# Patient Record
Sex: FEMALE | Race: WHITE | NOT HISPANIC OR LATINO | Employment: FULL TIME | URBAN - METROPOLITAN AREA
[De-identification: names, ages, dates, MRNs, and addresses within clinical notes are randomized per-mention and may not be internally consistent; named-entity substitution may affect disease eponyms.]

---

## 2017-10-05 ENCOUNTER — WORRISOME GROWTH - SEE NOTE (OUTPATIENT)
Dept: URBAN - METROPOLITAN AREA CLINIC 36 | Facility: CLINIC | Age: 27
Setting detail: DERMATOLOGY
End: 2017-10-05

## 2017-10-05 PROCEDURE — 11900 INJECT SKIN LESIONS </W 7: CPT

## 2017-10-05 PROCEDURE — 99202 OFFICE O/P NEW SF 15 MIN: CPT

## 2018-04-24 ENCOUNTER — ACNE/ROSACEA (OUTPATIENT)
Dept: URBAN - METROPOLITAN AREA CLINIC 36 | Facility: CLINIC | Age: 28
Setting detail: DERMATOLOGY
End: 2018-04-24

## 2018-04-24 PROCEDURE — 99213 OFFICE O/P EST LOW 20 MIN: CPT

## 2018-04-24 RX ORDER — SPIRONOLACTONE 50 MG/1
1 TABLET TABLET, FILM COATED ORAL BID
Qty: 60 | Refills: 3
Start: 2018-04-24

## 2018-04-24 RX ORDER — CLINDAMYCIN 1 G/10ML
1 ML GEL TOPICAL DAILY
Qty: 75 | Refills: 6
Start: 2018-04-24

## 2018-04-25 ENCOUNTER — RX ONLY (RX ONLY)
Age: 28
End: 2018-04-25

## 2018-04-25 RX ORDER — ADAPALENE 3 MG/G
1 APPLICATION GEL TOPICAL QHS
Qty: 45 | Refills: 2
Start: 2018-04-25

## 2018-04-26 ENCOUNTER — RX ONLY (RX ONLY)
Age: 28
End: 2018-04-26

## 2018-04-26 RX ORDER — CLINDAMYCIN PHOSPHATE 10 MG/G
1 APPLICATION GEL TOPICAL NIGHTLY
Qty: 60 | Refills: 3
Start: 2018-04-26

## 2021-01-13 ENCOUNTER — DASHBOARD ENCOUNTERS (OUTPATIENT)
Age: 31
End: 2021-01-13

## 2021-01-13 ENCOUNTER — OFFICE VISIT (OUTPATIENT)
Dept: URBAN - METROPOLITAN AREA CLINIC 128 | Facility: CLINIC | Age: 31
End: 2021-01-13
Payer: COMMERCIAL

## 2021-01-13 DIAGNOSIS — R10.11 RUQ PAIN: ICD-10-CM

## 2021-01-13 DIAGNOSIS — Z34.90 PREGNANCY: ICD-10-CM

## 2021-01-13 PROCEDURE — 99204 OFFICE O/P NEW MOD 45 MIN: CPT | Performed by: INTERNAL MEDICINE

## 2021-01-13 PROCEDURE — G8420 CALC BMI NORM PARAMETERS: HCPCS | Performed by: INTERNAL MEDICINE

## 2021-01-13 PROCEDURE — G9903 PT SCRN TBCO ID AS NON USER: HCPCS | Performed by: INTERNAL MEDICINE

## 2021-01-13 PROCEDURE — G8427 DOCREV CUR MEDS BY ELIG CLIN: HCPCS | Performed by: INTERNAL MEDICINE

## 2021-01-13 RX ORDER — FAMOTIDINE 20 MG/1
1 TABLET TABLET, FILM COATED ORAL TWICE A DAY
Qty: 60 | Refills: 6 | OUTPATIENT
Start: 2021-01-13

## 2021-01-13 RX ORDER — VALACYCLOVIR HYDROCHLORIDE 500 MG/1
1 TABLET TABLET, FILM COATED ORAL ONCE A DAY
Status: ACTIVE | COMMUNITY

## 2021-01-13 NOTE — PHYSICAL EXAM GASTROINTESTINAL
Abdomen: c/w 28 week pregnancy, soft nt non-distended. No hepatosplenomegaly. no guarding or rebound. Rectal: defered.

## 2021-01-13 NOTE — HPI-TODAY'S VISIT:
28 week pregnant female with 7 weeks intermittent ruq pain worse with food that can last up to 5 hours. had ruq us that showed normal gb. describes sharp pain radiating to back and also grabbing pain.  no bleeding jaundice or melena.

## 2021-02-11 ENCOUNTER — OFFICE VISIT (OUTPATIENT)
Dept: URBAN - METROPOLITAN AREA CLINIC 128 | Facility: CLINIC | Age: 31
End: 2021-02-11